# Patient Record
Sex: MALE | Race: WHITE | Employment: FULL TIME | URBAN - METROPOLITAN AREA
[De-identification: names, ages, dates, MRNs, and addresses within clinical notes are randomized per-mention and may not be internally consistent; named-entity substitution may affect disease eponyms.]

---

## 2017-02-28 ENCOUNTER — HOSPITAL ENCOUNTER (OUTPATIENT)
Dept: CT IMAGING | Age: 37
Discharge: HOME | End: 2017-02-28
Attending: INTERNAL MEDICINE
Payer: COMMERCIAL

## 2017-02-28 DIAGNOSIS — R10.32 LLQ PAIN: ICD-10-CM

## 2017-02-28 PROCEDURE — 74011636320 HC RX REV CODE- 636/320: Performed by: INTERNAL MEDICINE

## 2017-02-28 PROCEDURE — 74011000258 HC RX REV CODE- 258: Performed by: INTERNAL MEDICINE

## 2017-02-28 PROCEDURE — 74177 CT ABD & PELVIS W/CONTRAST: CPT

## 2017-02-28 RX ORDER — SODIUM CHLORIDE 0.9 % (FLUSH) 0.9 %
10 SYRINGE (ML) INJECTION
Status: COMPLETED | OUTPATIENT
Start: 2017-02-28 | End: 2017-02-28

## 2017-02-28 RX ADMIN — IOVERSOL 100 ML: 741 INJECTION INTRA-ARTERIAL; INTRAVENOUS at 10:56

## 2017-02-28 RX ADMIN — Medication 10 ML: at 10:56

## 2017-02-28 RX ADMIN — SODIUM CHLORIDE 100 ML: 900 INJECTION, SOLUTION INTRAVENOUS at 10:56

## 2017-02-28 RX ADMIN — DIATRIZOATE MEGLUMINE AND DIATRIZOATE SODIUM 15 ML: 660; 100 LIQUID ORAL; RECTAL at 10:55

## 2019-06-03 PROBLEM — E66.09 CLASS 1 OBESITY DUE TO EXCESS CALORIES WITHOUT SERIOUS COMORBIDITY WITH BODY MASS INDEX (BMI) OF 33.0 TO 33.9 IN ADULT: Status: ACTIVE | Noted: 2019-06-03

## 2020-05-29 PROBLEM — E66.01 SEVERE OBESITY (HCC): Status: ACTIVE | Noted: 2020-05-29

## 2020-07-22 PROBLEM — F41.1 GAD (GENERALIZED ANXIETY DISORDER): Status: ACTIVE | Noted: 2020-07-22

## 2021-09-05 ENCOUNTER — HOSPITAL ENCOUNTER (EMERGENCY)
Age: 41
Discharge: HOME | End: 2021-09-05
Attending: EMERGENCY MEDICINE
Payer: COMMERCIAL

## 2021-09-05 ENCOUNTER — APPOINTMENT (OUTPATIENT)
Dept: CT IMAGING | Age: 41
End: 2021-09-05
Attending: EMERGENCY MEDICINE
Payer: COMMERCIAL

## 2021-09-05 VITALS
WEIGHT: 255 LBS | HEART RATE: 108 BPM | DIASTOLIC BLOOD PRESSURE: 92 MMHG | SYSTOLIC BLOOD PRESSURE: 154 MMHG | HEIGHT: 70 IN | OXYGEN SATURATION: 99 % | BODY MASS INDEX: 36.51 KG/M2 | TEMPERATURE: 98.1 F | RESPIRATION RATE: 18 BRPM

## 2021-09-05 DIAGNOSIS — R10.32 ABDOMINAL PAIN, LLQ (LEFT LOWER QUADRANT): Primary | ICD-10-CM

## 2021-09-05 LAB
ALBUMIN SERPL-MCNC: 3.9 G/DL (ref 3.5–5)
ALBUMIN/GLOB SERPL: 1 {RATIO} (ref 1.2–3.5)
ALP SERPL-CCNC: 67 U/L (ref 50–136)
ALT SERPL-CCNC: 95 U/L (ref 12–65)
ANION GAP SERPL CALC-SCNC: 4 MMOL/L (ref 7–16)
AST SERPL-CCNC: 82 U/L (ref 15–37)
BASOPHILS # BLD: 0 K/UL (ref 0–0.2)
BASOPHILS NFR BLD: 1 % (ref 0–2)
BILIRUB SERPL-MCNC: 0.5 MG/DL (ref 0.2–1.1)
BUN SERPL-MCNC: 8 MG/DL (ref 6–23)
CALCIUM SERPL-MCNC: 9.6 MG/DL (ref 8.3–10.4)
CHLORIDE SERPL-SCNC: 105 MMOL/L (ref 98–107)
CO2 SERPL-SCNC: 28 MMOL/L (ref 21–32)
CREAT SERPL-MCNC: 0.95 MG/DL (ref 0.8–1.5)
DIFFERENTIAL METHOD BLD: NORMAL
EOSINOPHIL # BLD: 0.3 K/UL (ref 0–0.8)
EOSINOPHIL NFR BLD: 5 % (ref 0.5–7.8)
ERYTHROCYTE [DISTWIDTH] IN BLOOD BY AUTOMATED COUNT: 12.6 % (ref 11.9–14.6)
GLOBULIN SER CALC-MCNC: 4 G/DL (ref 2.3–3.5)
GLUCOSE SERPL-MCNC: 108 MG/DL (ref 65–100)
HCT VFR BLD AUTO: 48.7 % (ref 41.1–50.3)
HGB BLD-MCNC: 16.7 G/DL (ref 13.6–17.2)
IMM GRANULOCYTES # BLD AUTO: 0 K/UL (ref 0–0.5)
IMM GRANULOCYTES NFR BLD AUTO: 0 % (ref 0–5)
LIPASE SERPL-CCNC: 133 U/L (ref 73–393)
LYMPHOCYTES # BLD: 2.4 K/UL (ref 0.5–4.6)
LYMPHOCYTES NFR BLD: 34 % (ref 13–44)
MCH RBC QN AUTO: 30.9 PG (ref 26.1–32.9)
MCHC RBC AUTO-ENTMCNC: 34.3 G/DL (ref 31.4–35)
MCV RBC AUTO: 90.2 FL (ref 79.6–97.8)
MONOCYTES # BLD: 0.6 K/UL (ref 0.1–1.3)
MONOCYTES NFR BLD: 9 % (ref 4–12)
NEUTS SEG # BLD: 3.5 K/UL (ref 1.7–8.2)
NEUTS SEG NFR BLD: 51 % (ref 43–78)
NRBC # BLD: 0 K/UL (ref 0–0.2)
PLATELET # BLD AUTO: 277 K/UL (ref 150–450)
PMV BLD AUTO: 9.7 FL (ref 9.4–12.3)
POTASSIUM SERPL-SCNC: 5.9 MMOL/L (ref 3.5–5.1)
PROT SERPL-MCNC: 7.9 G/DL (ref 6.3–8.2)
RBC # BLD AUTO: 5.4 M/UL (ref 4.23–5.6)
SODIUM SERPL-SCNC: 137 MMOL/L (ref 138–145)
WBC # BLD AUTO: 6.9 K/UL (ref 4.3–11.1)

## 2021-09-05 PROCEDURE — 99283 EMERGENCY DEPT VISIT LOW MDM: CPT

## 2021-09-05 PROCEDURE — 74177 CT ABD & PELVIS W/CONTRAST: CPT

## 2021-09-05 PROCEDURE — 80053 COMPREHEN METABOLIC PANEL: CPT

## 2021-09-05 PROCEDURE — 74011000258 HC RX REV CODE- 258: Performed by: EMERGENCY MEDICINE

## 2021-09-05 PROCEDURE — 85025 COMPLETE CBC W/AUTO DIFF WBC: CPT

## 2021-09-05 PROCEDURE — 74011000636 HC RX REV CODE- 636: Performed by: EMERGENCY MEDICINE

## 2021-09-05 PROCEDURE — 83690 ASSAY OF LIPASE: CPT

## 2021-09-05 RX ORDER — SODIUM CHLORIDE, SODIUM LACTATE, POTASSIUM CHLORIDE, CALCIUM CHLORIDE 600; 310; 30; 20 MG/100ML; MG/100ML; MG/100ML; MG/100ML
1000 INJECTION, SOLUTION INTRAVENOUS CONTINUOUS
Status: DISCONTINUED | OUTPATIENT
Start: 2021-09-05 | End: 2021-09-05 | Stop reason: HOSPADM

## 2021-09-05 RX ORDER — SODIUM CHLORIDE 0.9 % (FLUSH) 0.9 %
5-10 SYRINGE (ML) INJECTION AS NEEDED
Status: DISCONTINUED | OUTPATIENT
Start: 2021-09-05 | End: 2021-09-05 | Stop reason: HOSPADM

## 2021-09-05 RX ORDER — HYDROCODONE BITARTRATE AND ACETAMINOPHEN 5; 325 MG/1; MG/1
1-2 TABLET ORAL
Qty: 19 TABLET | Refills: 0 | Status: SHIPPED | OUTPATIENT
Start: 2021-09-05 | End: 2021-09-08

## 2021-09-05 RX ORDER — SODIUM CHLORIDE 0.9 % (FLUSH) 0.9 %
5-10 SYRINGE (ML) INJECTION EVERY 8 HOURS
Status: DISCONTINUED | OUTPATIENT
Start: 2021-09-05 | End: 2021-09-05 | Stop reason: HOSPADM

## 2021-09-05 RX ORDER — SODIUM CHLORIDE 0.9 % (FLUSH) 0.9 %
10 SYRINGE (ML) INJECTION
Status: COMPLETED | OUTPATIENT
Start: 2021-09-05 | End: 2021-09-05

## 2021-09-05 RX ADMIN — IOPAMIDOL 100 ML: 755 INJECTION, SOLUTION INTRAVENOUS at 13:35

## 2021-09-05 RX ADMIN — Medication 10 ML: at 13:36

## 2021-09-05 RX ADMIN — SODIUM CHLORIDE 100 ML: 900 INJECTION, SOLUTION INTRAVENOUS at 13:36

## 2021-09-05 NOTE — ED TRIAGE NOTES
Pt ambulates to triage with mask on.  Pt states that he has been having abdominal pain for 2 weeks and has a hx of diverticulitis and had surgeyr for this issue in 2015

## 2021-09-05 NOTE — ED NOTES
I have reviewed discharge instructions with the patient. The patient verbalized understanding. Patient left ED via Discharge Method: ambulatory to Home with self. Opportunity for questions and clarification provided. Patient given 1 scripts. No e-sign. To continue your aftercare when you leave the hospital, you may receive an automated call from our care team to check in on how you are doing. This is a free service and part of our promise to provide the best care and service to meet your aftercare needs.  If you have questions, or wish to unsubscribe from this service please call [phone]. Thank you for Choosing our [facility] Emergency Department.

## 2021-09-05 NOTE — ED PROVIDER NOTES
49-year-old male presents to the ER with complaints of progressive left lower quadrant abdominal pain over more than a week. Denies overt fever vomiting or diarrhea  Patient states his symptoms remind him of previous episodes of diverticulitis. Patient did have partial colectomy in 2015 at [facility] for the diverticulitis. He has not had prior flareups since that time  Patient using high Cosamin with no significant improvement in his symptoms    The history is provided by the patient. Abdominal Pain   This is a recurrent problem. The current episode started more than 2 days ago. The problem occurs constantly. The problem has been gradually worsening. The pain is associated with an unknown factor. The pain is located in the LLQ. The quality of the pain is aching and cramping. The pain is moderate. Associated symptoms include a fever. Pertinent negatives include no diarrhea, no nausea, no vomiting, no constipation, no dysuria, no headaches, no arthralgias, no myalgias, no trauma, no chest pain and no back pain. The pain is worsened by eating. The pain is relieved by nothing. Past workup includes CT scan, surgery. His past medical history is significant for diverticulitis. The patient's surgical history includes colectomy.        Past Medical History:   Diagnosis Date    Diverticulitis 2015       Past Surgical History:   Procedure Laterality Date    HX GI  10/2015    colonectomy due to diverticulitis at [facility] History:   Problem Relation Age of Onset    Cancer Mother         leiomyosarcoma - leg    Heart Disease Father         heart valve leaky    Hypertension Father        Social History     Socioeconomic History    Marital status:      Spouse name: Not on file    Number of children: Not on file    Years of education: Not on file    Highest education level: Not on file   Occupational History    Not on file   Tobacco Use    Smoking status: Never Smoker    Smokeless tobacco: Never Used   Substance and Sexual Activity    Alcohol use: Yes     Comment: 2-3 x a week    Drug use: No    Sexual activity: Not on file   Other Topics Concern    Not on file   Social History Narrative    Not on file     Social Determinants of Health     Financial Resource Strain:     Difficulty of Paying Living Expenses:    Food Insecurity:     Worried About Running Out of Food in the Last Year:     [address] in the Last Year:    Transportation Needs:     Lack of Transportation (Medical):  Lack of Transportation (Non-Medical):    Physical Activity:     Days of Exercise per Week:     Minutes of Exercise per Session:    Stress:     Feeling of Stress :    Social Connections:     Frequency of Communication with Friends and Family:     Frequency of Social Gatherings with Friends and Family:     Attends Mandaeism Services:     Active Member of Clubs or Organizations:     Attends Club or Organization Meetings:     Marital Status:    Intimate Partner Violence:     Fear of Current or Ex-Partner:     Emotionally Abused:     Physically Abused:     Sexually Abused: ALLERGIES: Patient has no known allergies. Review of Systems   Constitutional: Positive for fever. Negative for activity change, chills and diaphoresis. HENT: Negative for dental problem, hearing loss, nosebleeds, rhinorrhea and sore throat. Eyes: Negative for pain, discharge, redness and visual disturbance. Respiratory: Negative for cough, chest tightness and shortness of breath. Cardiovascular: Negative for chest pain, palpitations and leg swelling. Gastrointestinal: Positive for abdominal pain. Negative for constipation, diarrhea, nausea and vomiting. Endocrine: Negative for cold intolerance, heat intolerance, polydipsia and polyuria. Genitourinary: Negative for dysuria and flank pain. Musculoskeletal: Negative for arthralgias, back pain, joint swelling, myalgias and neck pain.    Skin: Negative for pallor and rash. Allergic/Immunologic: Negative for environmental allergies and food allergies. Neurological: Negative for dizziness, tremors, light-headedness, numbness and headaches. Hematological: Negative for adenopathy. Does not bruise/bleed easily. Psychiatric/Behavioral: Negative for confusion and dysphoric mood. The patient is not nervous/anxious and is not hyperactive. All other systems reviewed and are negative. Vitals:    09/05/21 1135   BP: (!) 161/104   Pulse: (!) 111   Resp: 17   Temp: 98.3 °F (36.8 °C)   SpO2: 98%   Weight: 115.7 kg (255 lb)   Height: 5' 10\" (1.778 m)            Physical Exam  Vitals and nursing note reviewed. Constitutional:       General: He is in acute distress. Appearance: He is well-developed. He is obese. HENT:      Head: Normocephalic and atraumatic. Right Ear: External ear normal.      Left Ear: External ear normal.      Mouth/Throat:      Mouth: Mucous membranes are moist.      Pharynx: Oropharynx is clear. No oropharyngeal exudate. Eyes:      General: No scleral icterus. Extraocular Movements: Extraocular movements intact. Conjunctiva/sclera: Conjunctivae normal.      Pupils: Pupils are equal, round, and reactive to light. Neck:      Thyroid: No thyromegaly. Vascular: No JVD. Cardiovascular:      Rate and Rhythm: Normal rate and regular rhythm. Heart sounds: Normal heart sounds. No murmur heard. No friction rub. No gallop. Pulmonary:      Effort: Pulmonary effort is normal. No respiratory distress. Breath sounds: Normal breath sounds. No wheezing. Abdominal:      General: Bowel sounds are normal. There is no distension. Palpations: Abdomen is soft. Tenderness: There is abdominal tenderness in the left lower quadrant. There is no right CVA tenderness, left CVA tenderness, guarding or rebound. Musculoskeletal:         General: No tenderness or deformity. Normal range of motion. Cervical back: Normal range of motion and neck supple. Skin:     General: Skin is warm and dry. Capillary Refill: Capillary refill takes less than 2 seconds. Findings: No rash. Neurological:      General: No focal deficit present. Mental Status: He is alert and oriented to person, place, and time. Cranial Nerves: No cranial nerve deficit. Sensory: No sensory deficit. Motor: No abnormal muscle tone. Coordination: Coordination normal.   Psychiatric:         Mood and Affect: Mood normal.         Behavior: Behavior normal.         Thought Content: Thought content normal.         Judgment: Judgment normal.          MDM  Number of Diagnoses or Management Options  Abdominal pain, LLQ (left lower quadrant): new and requires workup  Diagnosis management comments: 73-year-old male with left lower quadrant abdominal pain    Differential diagnosis would include viral syndrome, muscle strain, diverticulitis, enteritis, abscess, perforation kidney stone, UTI    2:03 PM  Work-up today reveals no evidence of acute findings on his CT  Labs unremarkable as well    Patient remains reasonably comfortable    We will give the patient a few Norco in case he has a flareup of pain until he can see his regular doctor       Amount and/or Complexity of Data Reviewed  Clinical lab tests: ordered and reviewed  Tests in the radiology section of CPT®: ordered and reviewed  Tests in the medicine section of CPT®: reviewed  Decide to obtain previous medical records or to obtain history from someone other than the patient: yes  Review and summarize past medical records: yes  Independent visualization of images, tracings, or specimens: yes    Risk of Complications, Morbidity, and/or Mortality  Presenting problems: moderate  Diagnostic procedures: moderate  Management options: low  General comments: Elements of this note have been dictated via voice recognition software.   Text and phrases may be limited by the accuracy of the software. The chart has been reviewed, but errors may still be present.       Patient Progress  Patient progress: stable         Procedures

## 2021-09-05 NOTE — DISCHARGE INSTRUCTIONS
Take medications as directed  Call your family doctor for follow-up visit  Do not drink alcohol or drive while taking the prescription pain medications  No lifting, bending or other strenous activities  Return to ER for any worsening symptoms or new problems which may arise

## 2022-01-10 ENCOUNTER — HOSPITAL ENCOUNTER (OUTPATIENT)
Dept: CT IMAGING | Age: 42
Discharge: HOME | End: 2022-01-10
Attending: INTERNAL MEDICINE
Payer: SELF-PAY

## 2022-01-10 DIAGNOSIS — E66.09 CLASS 2 OBESITY DUE TO EXCESS CALORIES WITHOUT SERIOUS COMORBIDITY WITH BODY MASS INDEX (BMI) OF 38.0 TO 38.9 IN ADULT: ICD-10-CM

## 2022-01-10 PROCEDURE — 75571 CT HRT W/O DYE W/CA TEST: CPT

## 2022-03-18 PROBLEM — E66.09 CLASS 2 OBESITY DUE TO EXCESS CALORIES WITHOUT SERIOUS COMORBIDITY WITH BODY MASS INDEX (BMI) OF 38.0 TO 38.9 IN ADULT: Status: ACTIVE | Noted: 2019-06-03

## 2022-03-18 PROBLEM — E66.812 CLASS 2 OBESITY DUE TO EXCESS CALORIES WITHOUT SERIOUS COMORBIDITY WITH BODY MASS INDEX (BMI) OF 38.0 TO 38.9 IN ADULT: Status: ACTIVE | Noted: 2019-06-03

## 2022-05-16 DIAGNOSIS — R79.89 ELEVATED LIVER FUNCTION TESTS: Primary | ICD-10-CM

## 2022-07-13 ENCOUNTER — OFFICE VISIT (OUTPATIENT)
Dept: FAMILY MEDICINE CLINIC | Facility: CLINIC | Age: 42
End: 2022-07-13
Payer: COMMERCIAL

## 2022-07-13 VITALS
BODY MASS INDEX: 39.57 KG/M2 | SYSTOLIC BLOOD PRESSURE: 126 MMHG | RESPIRATION RATE: 16 BRPM | HEIGHT: 70 IN | HEART RATE: 83 BPM | WEIGHT: 276.4 LBS | DIASTOLIC BLOOD PRESSURE: 84 MMHG | OXYGEN SATURATION: 99 % | TEMPERATURE: 97.2 F

## 2022-07-13 DIAGNOSIS — M54.6 ACUTE MIDLINE THORACIC BACK PAIN: Primary | ICD-10-CM

## 2022-07-13 PROCEDURE — 99214 OFFICE O/P EST MOD 30 MIN: CPT | Performed by: FAMILY MEDICINE

## 2022-07-13 RX ORDER — BACLOFEN 10 MG/1
TABLET ORAL
Qty: 45 TABLET | Refills: 1 | Status: SHIPPED | OUTPATIENT
Start: 2022-07-13

## 2022-07-13 RX ORDER — PREDNISONE 20 MG/1
20 TABLET ORAL 2 TIMES DAILY
Qty: 10 TABLET | Refills: 0 | Status: SHIPPED | OUTPATIENT
Start: 2022-07-13 | End: 2022-07-18

## 2022-07-13 ASSESSMENT — PATIENT HEALTH QUESTIONNAIRE - PHQ9
2. FEELING DOWN, DEPRESSED OR HOPELESS: 0
SUM OF ALL RESPONSES TO PHQ9 QUESTIONS 1 & 2: 0
SUM OF ALL RESPONSES TO PHQ QUESTIONS 1-9: 0
1. LITTLE INTEREST OR PLEASURE IN DOING THINGS: 0
SUM OF ALL RESPONSES TO PHQ QUESTIONS 1-9: 0

## 2022-07-13 ASSESSMENT — ENCOUNTER SYMPTOMS
BOWEL INCONTINENCE: 0
BACK PAIN: 1
ABDOMINAL PAIN: 0

## 2022-07-13 NOTE — PROGRESS NOTES
HISTORY OF PRESENT ILLNESS  Chief Complaint   Patient presents with    Back Pain     middle; started about 2 weeks       Working out at the gym and then started jogging again  Back started hurting and then went away. Did a 10 min jagg and still there. Tried massage heped some  Coughs or sneezing stabbing pain in the mid back. Using advil has to repeat the dose 6-8 h later    history of back injury 20 yrs ago - given naproxen and MR and got better. Lower thoracic area more on the right but is on both sides  Bending over and stretched and then started spasms. Usually as low as a 2 with advil    alleve did not relieve the pain    Wt Readings from Last 3 Encounters:   07/13/22 276 lb 6.4 oz (125.4 kg)   02/23/22 270 lb (122.5 kg)   02/17/22 265 lb (120.2 kg)      BP Readings from Last 3 Encounters:   07/13/22 126/84   02/23/22 128/78   02/17/22 (!) 128/90        Back Pain  This is a new problem. The current episode started 1 to 4 weeks ago. The problem occurs daily. The problem is unchanged. The pain is present in the thoracic spine. The quality of the pain is described as stabbing. The pain does not radiate. The pain is at a severity of 9/10. The symptoms are aggravated by bending, position, twisting and coughing. Stiffness is present In the morning. Pertinent negatives include no abdominal pain, bladder incontinence, bowel incontinence, chest pain, dysuria, fever, headaches, leg pain, numbness, paresis, paresthesias, pelvic pain, perianal numbness, tingling, weakness or weight loss. He has tried analgesics, NSAIDs, walking, heat, ice and bed rest for the symptoms. The treatment provided mild relief. Review of Systems   Constitutional:  Negative for fever and weight loss. Cardiovascular:  Negative for chest pain. Gastrointestinal:  Negative for abdominal pain and bowel incontinence. Genitourinary:  Negative for bladder incontinence, dysuria and pelvic pain.    Musculoskeletal:  Positive for back pain.   Neurological:  Negative for tingling, weakness, numbness, headaches and paresthesias. Patient Active Problem List   Diagnosis    Class 2 obesity due to excess calories without serious comorbidity with body mass index (BMI) of 38.0 to 38.9 in adult         Blood pressure 126/84, pulse 83, temperature 97.2 °F (36.2 °C), temperature source Temporal, resp. rate 16, height 5' 10\" (1.778 m), weight 276 lb 6.4 oz (125.4 kg), SpO2 99 %. Pain Scale: /10 Pain Location:   Body mass index is 39.66 kg/m². Physical Exam  Vitals and nursing note reviewed. Constitutional:       General: He is not in acute distress. Appearance: He is normal weight. He is not toxic-appearing. HENT:      Head: Normocephalic. Eyes:      Extraocular Movements: Extraocular movements intact. Conjunctiva/sclera: Conjunctivae normal.      Pupils: Pupils are equal, round, and reactive to light. Pupils are equal.   Cardiovascular:      Heart sounds: Normal heart sounds. No murmur heard. No friction rub. No gallop. Pulmonary:      Effort: Pulmonary effort is normal. No respiratory distress. Breath sounds: Normal breath sounds. No stridor. No wheezing, rhonchi or rales. Chest:      Chest wall: No tenderness. Musculoskeletal:      Thoracic back: Tenderness present. No swelling, edema, deformity, lacerations, spasms or bony tenderness. Normal range of motion. Skin:     General: Skin is warm and dry. Neurological:      General: No focal deficit present. Mental Status: He is alert. Sensory: Sensation is intact. Motor: Motor function is intact. Psychiatric:         Mood and Affect: Mood normal.         Behavior: Behavior normal.         Thought Content: Thought content normal.         Judgment: Judgment normal.            ASSESSMENT and PLAN   Diagnosis Orders   1.  Acute midline thoracic back pain  predniSONE (DELTASONE) 20 MG tablet    baclofen (LIORESAL) 10 MG tablet          Reviewed medications and side effects in detail    The patient's condition was discussed at length with the patient. The expected course and possible complications were reviewed. All questions were answered. His other chronic conditions are stable at this time. He is stable on the current treatment and tolerating it well. No significant sides effects. Will not adjust therapy at this time, unless noted above. We will continue to monitor for any problems. Medications refilled and lab work has been ordered where needed. Reviewed medications are explained including any potential interactions or side effects in detail. The patient's questions were answered. He  understands the above and has no further questions. Further workup and treatment should be done if symptoms persist, worsen or new symptoms occur. He  will call to notify us of any problems, complications or worsening symptoms. Patient Instructions     Patient Education        Healthy Upper Back: Exercises  Introduction  Here are some examples of exercises for your upper back. Start each exerciseslowly. Ease off the exercise if you start to have pain. Your doctor or physical therapist will tell you when you can start theseexercises and which ones will work best for you. How to do the exercises  Lower neck and upper back stretch    Stretch your arms out in front of your body. Clasp one hand on top of your other hand. Gently reach out so that you feel your shoulder blades stretching away from each other. Gently bend your head forward. Hold for 15 to 30 seconds. Repeat 2 to 4 times. Midback stretch    If you have knee pain, do not do this exercise. Kneel on the floor, and sit back on your ankles. Lean forward, place your hands on the floor, and stretch your arms out in front of you. Rest your head between your arms. Gently push your chest toward the floor, reaching as far in front of you as possible. Hold for 15 to 30 seconds.   Repeat 2 to 4 times.  Shoulder rolls    Sit comfortably with your feet shoulder-width apart. You can also do this exercise while standing. Roll your shoulders up, then back, and then down in a smooth, circular motion. Repeat 2 to 4 times. Wall push-up    Stand against a wall with your feet about 12 to 24 inches back from the wall. If you feel any pain when you do this exercise, stand closer to the wall. Place your hands on the wall slightly wider apart than your shoulders, and lean forward. Gently lean your body toward the wall. Then push back to your starting position. Keep the motion smooth and controlled. Repeat 8 to 12 times. Resisted shoulder blade squeeze    For this exercise, you will need elastic exercise material, such as surgicaltubing or Thera-Band. Sit or stand, holding the band in both hands in front of you. Keep your elbows close to your sides, bent at a 90-degree angle. Your palms should face up. Squeeze your shoulder blades together, and move your arms to the outside, stretching the band. Be sure to keep your elbows at your sides while you do this. Relax. Repeat 8 to 12 times. Resisted rows    For this exercise, you will need elastic exercise material, such as surgicaltubing or Thera-Band. Put the band around a solid object, such as a bedpost, at about waist level. Hold one end of the band in each hand. With your elbows at your sides and bent to 90 degrees, pull the band back to move your shoulder blades toward each other. Return to the starting position. Repeat 8 to 12 times. Follow-up care is a key part of your treatment and safety. Be sure to make and go to all appointments, and call your doctor if you are having problems. It's also a good idea to know your test results and keep alist of the medicines you take. Where can you learn more? Go to https://chjaninaeb.Mieple. org and sign in to your [employer] account.  Enter P802 in the [employer] box to learn more about \"Healthy Upper Back: Exercises. \"     If you do not have an account, please click on the \"Sign Up Now\" link. Current as of: March 9, 2022               Content Version: 13.3  © 2006-2022 Healthwise, Incorporated. Care instructions adapted under license by [facility] ([facility]). If you have questions about a medical condition or this instruction, always ask your healthcare professional. [address] any warranty or liability for your use of this information. Patient Education        Low Back Pain: Exercises  Introduction  Here are some examples of exercises for you to try. The exercises may be suggested for a condition or for rehabilitation. Start each exercise slowly. Ease off the exercises if you start to have pain. You will be told when to start these exercises and which ones will work bestfor you. How to do the exercises  Press-up    Lie on your stomach, supporting your body with your forearms. Press your elbows down into the floor to raise your upper back. As you do this, relax your stomach muscles and allow your back to arch without using your back muscles. As your press up, do not let your hips or pelvis come off the floor. Hold for 15 to 30 seconds, then relax. Repeat 2 to 4 times. Alternate arm and leg (bird dog) exercise    Do this exercise slowly. Try to keep your body straight at all times, and donot let one hip drop lower than the other. Start on the floor, on your hands and knees. Tighten your belly muscles. Raise one leg off the floor, and hold it straight out behind you. Be careful not to let your hip drop down, because that will twist your trunk. Hold for about 6 seconds, then lower your leg and switch to the other leg. Repeat 8 to 12 times on each leg. Over time, work up to holding for 10 to 30 seconds each time. If you feel stable and secure with your leg raised, try raising the opposite arm straight out in front of you at the same time.   Knee-to-chest exercise    Lie on your back with your knees bent and your feet flat on the floor. Bring one knee to your chest, keeping the other foot flat on the floor (or keeping the other leg straight, whichever feels better on your lower back). Keep your lower back pressed to the floor. Hold for at least 15 to 30 seconds. Relax, and lower the knee to the starting position. Repeat with the other leg. Repeat 2 to 4 times with each leg. To get more stretch, put your other leg flat on the floor while pulling your knee to your chest.  Curl-ups    Lie on the floor on your back with your knees bent at a 90-degree angle. Your feet should be flat on the floor, about 12 inches from your buttocks. Cross your arms over your chest. If this bothers your neck, try putting your hands behind your neck (not your head), with your elbows spread apart. Slowly tighten your belly muscles and raise your shoulder blades off the floor. Keep your head in line with your body, and do not press your chin to your chest.  Hold this position for 1 or 2 seconds, then slowly lower yourself back down to the floor. Repeat 8 to 12 times. Pelvic tilt exercise    Lie on your back with your knees bent. \"Brace\" your stomach. This means to tighten your muscles by pulling in and imagining your belly button moving toward your spine. You should feel like your back is pressing to the floor and your hips and pelvis are rocking back. Hold for about 6 seconds while you breathe smoothly. Repeat 8 to 12 times. Heel dig bridging    Lie on your back with both knees bent and your ankles bent so that only your heels are digging into the floor. Your knees should be bent about 90 degrees. Then push your heels into the floor, squeeze your buttocks, and lift your hips off the floor until your shoulders, hips, and knees are all in a straight line.   Hold for about 6 seconds as you continue to breathe normally, and then slowly lower your hips back down to the floor and rest for up to 10 seconds. Do 8 to 12 repetitions. Hamstring stretch in doorway    Lie on your back in a doorway, with one leg through the open door. Slide your leg up the wall to straighten your knee. You should feel a gentle stretch down the back of your leg. Hold the stretch for at least 15 to 30 seconds. Do not arch your back, point your toes, or bend either knee. Keep one heel touching the floor and the other heel touching the wall. Repeat with your other leg. Do 2 to 4 times for each leg. Hip flexor stretch    Kneel on the floor with one knee bent and one leg behind you. Place your forward knee over your foot. Keep your other knee touching the floor. Slowly push your hips forward until you feel a stretch in the upper thigh of your rear leg. Hold the stretch for at least 15 to 30 seconds. Repeat with your other leg. Do 2 to 4 times on each side. Wall sit    Stand with your back 10 to 12 inches away from a wall. Lean into the wall until your back is flat against it. Slowly slide down until your knees are slightly bent, pressing your lower back into the wall. Hold for about 6 seconds, then slide back up the wall. Repeat 8 to 12 times. Follow-up care is a key part of your treatment and safety. Be sure to make and go to all appointments, and call your doctor if you are having problems. It's also a good idea to know your test results and keep alist of the medicines you take. Where can you learn more? Go to https://Doodle MobilekristieINAPPIN.[employer]. org and sign in to your [employer] account. Enter J314 in the [facility] box to learn more about \"Low Back Pain: Exercises. \"     If you do not have an account, please click on the \"Sign Up Now\" link. Current as of: March 9, 2022               Content Version: 13.3  © 4422-3247 Healthwise, Incorporated. Care instructions adapted under license by [facility] ([facility]).  If you have questions about a medical condition or this instruction, always ask your healthcare professional. [address] any warranty or liability for your use of this information. (Some details in this note may have been created with speech-recognition software. Some errors in speech recognition may have occurred.    Most of those have been corrected but some may have been missed.)         Isabella Olivera MD  [address] [facility] [address]  Phone [phone]

## 2022-07-13 NOTE — PATIENT INSTRUCTIONS
Patient Education        Healthy Upper Back: Exercises  Introduction  Here are some examples of exercises for your upper back. Start each exerciseslowly. Ease off the exercise if you start to have pain. Your doctor or physical therapist will tell you when you can start theseexercises and which ones will work best for you. How to do the exercises  Lower neck and upper back stretch    1. Stretch your arms out in front of your body. Clasp one hand on top of your other hand. 2. Gently reach out so that you feel your shoulder blades stretching away from each other. 3. Gently bend your head forward. 4. Hold for 15 to 30 seconds. 5. Repeat 2 to 4 times. Midback stretch    If you have knee pain, do not do this exercise. 1. Kneel on the floor, and sit back on your ankles. 2. Lean forward, place your hands on the floor, and stretch your arms out in front of you. Rest your head between your arms. 3. Gently push your chest toward the floor, reaching as far in front of you as possible. 4. Hold for 15 to 30 seconds. 5. Repeat 2 to 4 times. Shoulder rolls    1. Sit comfortably with your feet shoulder-width apart. You can also do this exercise while standing. 2. Roll your shoulders up, then back, and then down in a smooth, circular motion. 3. Repeat 2 to 4 times. Wall push-up    1. Stand against a wall with your feet about 12 to 24 inches back from the wall. If you feel any pain when you do this exercise, stand closer to the wall. 2. Place your hands on the wall slightly wider apart than your shoulders, and lean forward. 3. Gently lean your body toward the wall. Then push back to your starting position. Keep the motion smooth and controlled. 4. Repeat 8 to 12 times. Resisted shoulder blade squeeze    For this exercise, you will need elastic exercise material, such as surgicaltubing or Thera-Band. 1. Sit or stand, holding the band in both hands in front of you.  Keep your elbows close to your sides, bent at a 90-degree angle. Your palms should face up. 2. Squeeze your shoulder blades together, and move your arms to the outside, stretching the band. Be sure to keep your elbows at your sides while you do this. 3. Relax. 4. Repeat 8 to 12 times. Resisted rows    For this exercise, you will need elastic exercise material, such as surgicaltubing or Thera-Band. 1. Put the band around a solid object, such as a bedpost, at about waist level. Hold one end of the band in each hand. 2. With your elbows at your sides and bent to 90 degrees, pull the band back to move your shoulder blades toward each other. Return to the starting position. 3. Repeat 8 to 12 times. Follow-up care is a key part of your treatment and safety. Be sure to make and go to all appointments, and call your doctor if you are having problems. It's also a good idea to know your test results and keep alist of the medicines you take. Where can you learn more? Go to https://CreditCardsOnline.WestBridge. org and sign in to your [employer] account. Enter Y487 in the [employer] box to learn more about \"Healthy Upper Back: Exercises. \"     If you do not have an account, please click on the \"Sign Up Now\" link. Current as of: March 9, 2022               Content Version: 13.3  © 2006-2022 Healthwise, Incorporated. Care instructions adapted under license by [facility] ([facility]). If you have questions about a medical condition or this instruction, always ask your healthcare professional. [place] [address] any warranty or liability for your use of this information. Patient Education        Low Back Pain: Exercises  Introduction  Here are some examples of exercises for you to try. The exercises may be suggested for a condition or for rehabilitation. Start each exercise slowly. Ease off the exercises if you start to have pain. You will be told when to start these exercises and which ones will work bestfor you.   How to do the exercises  Press-up    1. Lie on your stomach, supporting your body with your forearms. 2. Press your elbows down into the floor to raise your upper back. As you do this, relax your stomach muscles and allow your back to arch without using your back muscles. As your press up, do not let your hips or pelvis come off the floor. 3. Hold for 15 to 30 seconds, then relax. 4. Repeat 2 to 4 times. Alternate arm and leg (bird dog) exercise    Do this exercise slowly. Try to keep your body straight at all times, and donot let one hip drop lower than the other. 1. Start on the floor, on your hands and knees. 2. Tighten your belly muscles. 3. Raise one leg off the floor, and hold it straight out behind you. Be careful not to let your hip drop down, because that will twist your trunk. 4. Hold for about 6 seconds, then lower your leg and switch to the other leg. 5. Repeat 8 to 12 times on each leg. 6. Over time, work up to holding for 10 to 30 seconds each time. 7. If you feel stable and secure with your leg raised, try raising the opposite arm straight out in front of you at the same time. Knee-to-chest exercise    1. Lie on your back with your knees bent and your feet flat on the floor. 2. Bring one knee to your chest, keeping the other foot flat on the floor (or keeping the other leg straight, whichever feels better on your lower back). 3. Keep your lower back pressed to the floor. Hold for at least 15 to 30 seconds. 4. Relax, and lower the knee to the starting position. 5. Repeat with the other leg. Repeat 2 to 4 times with each leg. 6. To get more stretch, put your other leg flat on the floor while pulling your knee to your chest.  Curl-ups    1. Lie on the floor on your back with your knees bent at a 90-degree angle. Your feet should be flat on the floor, about 12 inches from your buttocks.   2. Cross your arms over your chest. If this bothers your neck, try putting your hands behind your neck (not your head), with your elbows spread apart. 3. Slowly tighten your belly muscles and raise your shoulder blades off the floor. 4. Keep your head in line with your body, and do not press your chin to your chest.  5. Hold this position for 1 or 2 seconds, then slowly lower yourself back down to the floor. 6. Repeat 8 to 12 times. Pelvic tilt exercise    1. Lie on your back with your knees bent. 2. \"Brace\" your stomach. This means to tighten your muscles by pulling in and imagining your belly button moving toward your spine. You should feel like your back is pressing to the floor and your hips and pelvis are rocking back. 3. Hold for about 6 seconds while you breathe smoothly. 4. Repeat 8 to 12 times. Heel dig bridging    1. Lie on your back with both knees bent and your ankles bent so that only your heels are digging into the floor. Your knees should be bent about 90 degrees. 2. Then push your heels into the floor, squeeze your buttocks, and lift your hips off the floor until your shoulders, hips, and knees are all in a straight line. 3. Hold for about 6 seconds as you continue to breathe normally, and then slowly lower your hips back down to the floor and rest for up to 10 seconds. 4. Do 8 to 12 repetitions. Hamstring stretch in doorway    1. Lie on your back in a doorway, with one leg through the open door. 2. Slide your leg up the wall to straighten your knee. You should feel a gentle stretch down the back of your leg. 3. Hold the stretch for at least 15 to 30 seconds. Do not arch your back, point your toes, or bend either knee. Keep one heel touching the floor and the other heel touching the wall. 4. Repeat with your other leg. 5. Do 2 to 4 times for each leg. Hip flexor stretch    1. Kneel on the floor with one knee bent and one leg behind you. Place your forward knee over your foot. Keep your other knee touching the floor.   2. Slowly push your hips forward until you feel a stretch in the upper thigh of your rear leg. 3. Hold the stretch for at least 15 to 30 seconds. Repeat with your other leg. 4. Do 2 to 4 times on each side. Wall sit    1. Stand with your back 10 to 12 inches away from a wall. 2. Lean into the wall until your back is flat against it. 3. Slowly slide down until your knees are slightly bent, pressing your lower back into the wall. 4. Hold for about 6 seconds, then slide back up the wall. 5. Repeat 8 to 12 times. Follow-up care is a key part of your treatment and safety. Be sure to make and go to all appointments, and call your doctor if you are having problems. It's also a good idea to know your test results and keep alist of the medicines you take. Where can you learn more? Go to https://chpeannaeb.[email or web address] org and sign in to your [employer] account. Enter Z655 in the [employer] box to learn more about \"Low Back Pain: Exercises. \"     If you do not have an account, please click on the \"Sign Up Now\" link. Current as of: March 9, 2022               Content Version: 13.3  © 5546-4410 Healthwise, Incorporated. Care instructions adapted under license by [facility] ([facility]). If you have questions about a medical condition or this instruction, always ask your healthcare professional. [address] any warranty or liability for your use of this information.

## 2022-12-28 ENCOUNTER — OFFICE VISIT (OUTPATIENT)
Dept: FAMILY MEDICINE CLINIC | Facility: CLINIC | Age: 42
End: 2022-12-28
Payer: COMMERCIAL

## 2022-12-28 VITALS
WEIGHT: 269.6 LBS | RESPIRATION RATE: 16 BRPM | HEIGHT: 70 IN | OXYGEN SATURATION: 98 % | BODY MASS INDEX: 38.6 KG/M2 | SYSTOLIC BLOOD PRESSURE: 127 MMHG | TEMPERATURE: 98.2 F | HEART RATE: 84 BPM | DIASTOLIC BLOOD PRESSURE: 89 MMHG

## 2022-12-28 DIAGNOSIS — Z13.1 SCREENING FOR DIABETES MELLITUS: ICD-10-CM

## 2022-12-28 DIAGNOSIS — Z00.00 ROUTINE GENERAL MEDICAL EXAMINATION AT A HEALTH CARE FACILITY: Primary | ICD-10-CM

## 2022-12-28 DIAGNOSIS — K76.0 FATTY LIVER: ICD-10-CM

## 2022-12-28 DIAGNOSIS — E66.09 CLASS 2 OBESITY DUE TO EXCESS CALORIES WITHOUT SERIOUS COMORBIDITY WITH BODY MASS INDEX (BMI) OF 38.0 TO 38.9 IN ADULT: ICD-10-CM

## 2022-12-28 DIAGNOSIS — R79.89 ELEVATED LIVER FUNCTION TESTS: ICD-10-CM

## 2022-12-28 DIAGNOSIS — Z11.4 SCREENING FOR HIV (HUMAN IMMUNODEFICIENCY VIRUS): ICD-10-CM

## 2022-12-28 DIAGNOSIS — Z13.0 SCREENING, ANEMIA, DEFICIENCY, IRON: ICD-10-CM

## 2022-12-28 DIAGNOSIS — Z13.6 SCREENING FOR CARDIOVASCULAR CONDITION: ICD-10-CM

## 2022-12-28 LAB
ALBUMIN SERPL-MCNC: 4.3 G/DL (ref 3.5–5)
ALBUMIN/GLOB SERPL: 1.4 {RATIO} (ref 0.4–1.6)
ALP SERPL-CCNC: 48 U/L (ref 50–136)
ALT SERPL-CCNC: 95 U/L (ref 12–65)
ANION GAP SERPL CALC-SCNC: 4 MMOL/L (ref 2–11)
AST SERPL-CCNC: 36 U/L (ref 15–37)
BASOPHILS # BLD: 0.1 K/UL (ref 0–0.2)
BASOPHILS NFR BLD: 1 % (ref 0–2)
BILIRUB DIRECT SERPL-MCNC: 0.2 MG/DL
BILIRUB SERPL-MCNC: 0.9 MG/DL (ref 0.2–1.1)
BUN SERPL-MCNC: 14 MG/DL (ref 6–23)
CALCIUM SERPL-MCNC: 9.7 MG/DL (ref 8.3–10.4)
CHLORIDE SERPL-SCNC: 103 MMOL/L (ref 101–110)
CHOLEST SERPL-MCNC: 178 MG/DL
CO2 SERPL-SCNC: 28 MMOL/L (ref 21–32)
CREAT SERPL-MCNC: 1.1 MG/DL (ref 0.8–1.5)
DIFFERENTIAL METHOD BLD: NORMAL
EOSINOPHIL # BLD: 0.2 K/UL (ref 0–0.8)
EOSINOPHIL NFR BLD: 4 % (ref 0.5–7.8)
ERYTHROCYTE [DISTWIDTH] IN BLOOD BY AUTOMATED COUNT: 12.3 % (ref 11.9–14.6)
GLOBULIN SER CALC-MCNC: 3.1 G/DL (ref 2.8–4.5)
GLUCOSE SERPL-MCNC: 104 MG/DL (ref 65–100)
HCT VFR BLD AUTO: 45.1 % (ref 41.1–50.3)
HDLC SERPL-MCNC: 50 MG/DL (ref 40–60)
HDLC SERPL: 3.6 {RATIO}
HGB BLD-MCNC: 15.7 G/DL (ref 13.6–17.2)
HIV 1+2 AB+HIV1 P24 AG SERPL QL IA: NONREACTIVE
HIV 1/2 RESULT COMMENT: NORMAL
IMM GRANULOCYTES # BLD AUTO: 0 K/UL (ref 0–0.5)
IMM GRANULOCYTES NFR BLD AUTO: 0 % (ref 0–5)
LDLC SERPL CALC-MCNC: 110.4 MG/DL
LYMPHOCYTES # BLD: 1.8 K/UL (ref 0.5–4.6)
LYMPHOCYTES NFR BLD: 33 % (ref 13–44)
MCH RBC QN AUTO: 31.7 PG (ref 26.1–32.9)
MCHC RBC AUTO-ENTMCNC: 34.8 G/DL (ref 31.4–35)
MCV RBC AUTO: 91.1 FL (ref 82–102)
MONOCYTES # BLD: 0.5 K/UL (ref 0.1–1.3)
MONOCYTES NFR BLD: 9 % (ref 4–12)
NEUTS SEG # BLD: 2.9 K/UL (ref 1.7–8.2)
NEUTS SEG NFR BLD: 53 % (ref 43–78)
NRBC # BLD: 0 K/UL (ref 0–0.2)
PLATELET # BLD AUTO: 289 K/UL (ref 150–450)
PMV BLD AUTO: 9.4 FL (ref 9.4–12.3)
POTASSIUM SERPL-SCNC: 4.6 MMOL/L (ref 3.5–5.1)
PROT SERPL-MCNC: 7.4 G/DL (ref 6.3–8.2)
RBC # BLD AUTO: 4.95 M/UL (ref 4.23–5.6)
SODIUM SERPL-SCNC: 135 MMOL/L (ref 133–143)
TRIGL SERPL-MCNC: 88 MG/DL (ref 35–150)
VLDLC SERPL CALC-MCNC: 17.6 MG/DL (ref 6–23)
WBC # BLD AUTO: 5.5 K/UL (ref 4.3–11.1)

## 2022-12-28 PROCEDURE — 99396 PREV VISIT EST AGE 40-64: CPT | Performed by: FAMILY MEDICINE

## 2022-12-28 SDOH — ECONOMIC STABILITY: FOOD INSECURITY: WITHIN THE PAST 12 MONTHS, THE FOOD YOU BOUGHT JUST DIDN'T LAST AND YOU DIDN'T HAVE MONEY TO GET MORE.: NEVER TRUE

## 2022-12-28 SDOH — ECONOMIC STABILITY: FOOD INSECURITY: WITHIN THE PAST 12 MONTHS, YOU WORRIED THAT YOUR FOOD WOULD RUN OUT BEFORE YOU GOT MONEY TO BUY MORE.: NEVER TRUE

## 2022-12-28 ASSESSMENT — PATIENT HEALTH QUESTIONNAIRE - PHQ9
SUM OF ALL RESPONSES TO PHQ QUESTIONS 1-9: 0
SUM OF ALL RESPONSES TO PHQ9 QUESTIONS 1 & 2: 0
SUM OF ALL RESPONSES TO PHQ QUESTIONS 1-9: 0
SUM OF ALL RESPONSES TO PHQ QUESTIONS 1-9: 0
2. FEELING DOWN, DEPRESSED OR HOPELESS: 0
SUM OF ALL RESPONSES TO PHQ QUESTIONS 1-9: 0
1. LITTLE INTEREST OR PLEASURE IN DOING THINGS: 0

## 2022-12-28 ASSESSMENT — SOCIAL DETERMINANTS OF HEALTH (SDOH): HOW HARD IS IT FOR YOU TO PAY FOR THE VERY BASICS LIKE FOOD, HOUSING, MEDICAL CARE, AND HEATING?: NOT HARD AT ALL

## 2022-12-28 NOTE — PROGRESS NOTES
[address] [facility] [address], [address]  Phone: [phone] Fax [phone]  Nirmala Keith MD             Mr. Gladis Carson is a 38 y.o.  y.o.  year old  male patient who comes in for complete physical. He has been doing well. No fatigue, and has been working onweight loss. No shortness of breath, chest pain, palpitations. No abdominal pain, diarrhea, or constipation. No abnormal skin changes. Has  been working on diet and exercise. Feels well. ROS:  Feeling well. No dyspnea or chest pain on exertion. No abdominal pain, change in bowel habits, black or bloody stools. Did see cardiology last year about his fast heart rate and had a calcium score and stress testing and everything was normal.  The cardiologist cleared him. His heart rate has come back to normal.  The sense was that it was due to COVID and he has recovered from that. He did have some elevations in his liver tests. He said he did have a CAT scan for an unrelated reason and it did show fatty liver. Mr. Gladis Carson  has a past medical history of Diverticulitis. Mr. Gladis Carson  has a past surgical history that includes gi (10/2015). Mr. Gladis Carson   Current Outpatient Medications   Medication Sig Dispense Refill    ibuprofen (ADVIL;MOTRIN) 200 MG tablet Take by mouth       No current facility-administered medications for this visit. Mr. Gladis Carson  reports that he has never smoked. He has never used smokeless tobacco. He reports current alcohol use. He reports that he does not use drugs. Mr. Gladis Carson family history includes Cancer in his mother; Heart Disease in his father; Hypertension in his father. No Known Allergies      /89   Pulse 84   Temp 98.2 °F (36.8 °C) (Temporal)   Resp 16   Ht 5' 10\" (1.778 m)   Wt 269 lb 9.6 oz (122.3 kg)   SpO2 98% Comment: room air sitting  BMI 38.68 kg/m²       General: alert, oriented x 3, pleasant.   HEENT: Normocephalic, atraumatic, pupils equal and reactive to light. Tympanic membranes intact without erythema or edema. Oropharynx clear. Neck: Supple, no masses or thyromegaly or lymphadenopathy. Lungs: clear to auscultation bilaterally without wheezing or rhonchi. CV: regular rate and rhythm, without murmurs, rubs, or gallops. Abdomen: soft, nontender, nondistended, no masses. No hepatosplenomegaly. No abdominal bruits. No CVAT tenderness. Ext: No lower extremity edema. No lesions about the feet. : deferred. Rectal: deferred    No results found for this visit on 12/28/22. No results found for any visits on 12/28/22. Olegario Ya was seen today for annual exam.    Diagnoses and all orders for this visit:    Routine general medical examination at a health care facility    Fatty liver  -     Comprehensive Metabolic Panel; Future  -     Comprehensive Metabolic Panel    Class 2 obesity due to excess calories without serious comorbidity with body mass index (BMI) of 38.0 to 38.9 in adult    Screening, anemia, deficiency, iron  -     CBC with Auto Differential; Future  -     CBC with Auto Differential    Screening for diabetes mellitus  -     Comprehensive Metabolic Panel; Future  -     Comprehensive Metabolic Panel    Screening for cardiovascular condition  -     Lipid Panel; Future  -     Lipid Panel    Screening for HIV (human immunodeficiency virus)  -     HIV 1/2 Ag/Ab, 4TH Generation,W Rflx Confirm; Future  -     HIV 1/2 Ag/Ab, 4TH Generation,W Rflx Confirm    Elevated liver function tests  -     Hepatic Function Panel    We did talk about reducing alcohol and really working on losing weight to help his fatty liver. He will work on this. 30 minutes of moderate aerobic exercise 5 days a week and green leafy vegetables daily for cardiovascular risk reduction.     Vanessa Castañeda MD

## 2022-12-30 NOTE — RESULT ENCOUNTER NOTE
Let patient know labs normal other than a slight elevation in his liver tests. Continue to really work on losing weight and reducing all alcohol.

## 2023-09-15 ENCOUNTER — PATIENT MESSAGE (OUTPATIENT)
Dept: FAMILY MEDICINE CLINIC | Facility: CLINIC | Age: 43
End: 2023-09-15

## 2023-09-15 DIAGNOSIS — Z12.11 SCREENING FOR MALIGNANT NEOPLASM OF COLON: Primary | ICD-10-CM

## 2023-09-18 NOTE — TELEPHONE ENCOUNTER
From: Cheko Kathleen  To: Dr. Randle Flax: 9/15/2023 2:46 PM EDT  Subject: Colonoscopy    Good afternoon Dr. Reza Chu lists that I am overdue for a colonoscopy. I turned 37 in May and recall that you had mentioned that was the age that I should begin getting them. Could you please order one so that I can schedule the appointment? Please let me know if you need any information from me.   Thanks

## 2024-01-07 ASSESSMENT — PATIENT HEALTH QUESTIONNAIRE - PHQ9
SUM OF ALL RESPONSES TO PHQ9 QUESTIONS 1 & 2: 0
2. FEELING DOWN, DEPRESSED OR HOPELESS: 0
1. LITTLE INTEREST OR PLEASURE IN DOING THINGS: 0
SUM OF ALL RESPONSES TO PHQ QUESTIONS 1-9: 0
2. FEELING DOWN, DEPRESSED OR HOPELESS: NOT AT ALL
1. LITTLE INTEREST OR PLEASURE IN DOING THINGS: NOT AT ALL
SUM OF ALL RESPONSES TO PHQ9 QUESTIONS 1 & 2: 0
SUM OF ALL RESPONSES TO PHQ QUESTIONS 1-9: 0

## 2024-01-10 ENCOUNTER — OFFICE VISIT (OUTPATIENT)
Dept: FAMILY MEDICINE CLINIC | Facility: CLINIC | Age: 44
End: 2024-01-10
Payer: COMMERCIAL

## 2024-01-10 VITALS
HEART RATE: 93 BPM | OXYGEN SATURATION: 98 % | WEIGHT: 267 LBS | SYSTOLIC BLOOD PRESSURE: 134 MMHG | RESPIRATION RATE: 16 BRPM | BODY MASS INDEX: 38.22 KG/M2 | DIASTOLIC BLOOD PRESSURE: 88 MMHG | TEMPERATURE: 97.3 F | HEIGHT: 70 IN

## 2024-01-10 DIAGNOSIS — R00.2 PALPITATION: ICD-10-CM

## 2024-01-10 DIAGNOSIS — Z13.1 SCREENING FOR DIABETES MELLITUS: ICD-10-CM

## 2024-01-10 DIAGNOSIS — K76.0 FATTY LIVER: ICD-10-CM

## 2024-01-10 DIAGNOSIS — Z90.49 H/O HEMICOLECTOMY: ICD-10-CM

## 2024-01-10 DIAGNOSIS — E66.09 CLASS 2 OBESITY DUE TO EXCESS CALORIES WITHOUT SERIOUS COMORBIDITY WITH BODY MASS INDEX (BMI) OF 38.0 TO 38.9 IN ADULT: ICD-10-CM

## 2024-01-10 DIAGNOSIS — Z13.0 SCREENING, ANEMIA, DEFICIENCY, IRON: ICD-10-CM

## 2024-01-10 DIAGNOSIS — Z00.00 ROUTINE GENERAL MEDICAL EXAMINATION AT A HEALTH CARE FACILITY: Primary | ICD-10-CM

## 2024-01-10 DIAGNOSIS — Z13.6 SCREENING FOR CARDIOVASCULAR CONDITION: ICD-10-CM

## 2024-01-10 LAB
ALBUMIN SERPL-MCNC: 4.5 G/DL (ref 3.5–5)
ALBUMIN/GLOB SERPL: 1.4 (ref 0.4–1.6)
ALP SERPL-CCNC: 43 U/L (ref 50–136)
ALT SERPL-CCNC: 108 U/L (ref 12–65)
ANION GAP SERPL CALC-SCNC: 3 MMOL/L (ref 2–11)
AST SERPL-CCNC: 45 U/L (ref 15–37)
BASOPHILS # BLD: 0.1 K/UL (ref 0–0.2)
BASOPHILS NFR BLD: 1 % (ref 0–2)
BILIRUB SERPL-MCNC: 1.2 MG/DL (ref 0.2–1.1)
BUN SERPL-MCNC: 14 MG/DL (ref 6–23)
CALCIUM SERPL-MCNC: 9.9 MG/DL (ref 8.3–10.4)
CHLORIDE SERPL-SCNC: 106 MMOL/L (ref 103–113)
CHOLEST SERPL-MCNC: 189 MG/DL
CO2 SERPL-SCNC: 28 MMOL/L (ref 21–32)
CREAT SERPL-MCNC: 1.1 MG/DL (ref 0.8–1.5)
DIFFERENTIAL METHOD BLD: NORMAL
EOSINOPHIL # BLD: 0.2 K/UL (ref 0–0.8)
EOSINOPHIL NFR BLD: 3 % (ref 0.5–7.8)
ERYTHROCYTE [DISTWIDTH] IN BLOOD BY AUTOMATED COUNT: 12.1 % (ref 11.9–14.6)
GLOBULIN SER CALC-MCNC: 3.2 G/DL (ref 2.8–4.5)
GLUCOSE SERPL-MCNC: 96 MG/DL (ref 65–100)
HCT VFR BLD AUTO: 48.6 % (ref 41.1–50.3)
HDLC SERPL-MCNC: 49 MG/DL (ref 40–60)
HDLC SERPL: 3.9
HGB BLD-MCNC: 16.6 G/DL (ref 13.6–17.2)
IMM GRANULOCYTES # BLD AUTO: 0 K/UL (ref 0–0.5)
IMM GRANULOCYTES NFR BLD AUTO: 0 % (ref 0–5)
LDLC SERPL CALC-MCNC: 126.8 MG/DL
LYMPHOCYTES # BLD: 2.2 K/UL (ref 0.5–4.6)
LYMPHOCYTES NFR BLD: 35 % (ref 13–44)
MCH RBC QN AUTO: 31 PG (ref 26.1–32.9)
MCHC RBC AUTO-ENTMCNC: 34.2 G/DL (ref 31.4–35)
MCV RBC AUTO: 90.7 FL (ref 82–102)
MONOCYTES # BLD: 0.6 K/UL (ref 0.1–1.3)
MONOCYTES NFR BLD: 10 % (ref 4–12)
NEUTS SEG # BLD: 3.2 K/UL (ref 1.7–8.2)
NEUTS SEG NFR BLD: 51 % (ref 43–78)
NRBC # BLD: 0 K/UL (ref 0–0.2)
PLATELET # BLD AUTO: 286 K/UL (ref 150–450)
PMV BLD AUTO: 9.5 FL (ref 9.4–12.3)
POTASSIUM SERPL-SCNC: 4.2 MMOL/L (ref 3.5–5.1)
PROT SERPL-MCNC: 7.7 G/DL (ref 6.3–8.2)
RBC # BLD AUTO: 5.36 M/UL (ref 4.23–5.6)
SODIUM SERPL-SCNC: 137 MMOL/L (ref 136–146)
TRIGL SERPL-MCNC: 66 MG/DL (ref 35–150)
TSH, 3RD GENERATION: 0.94 UIU/ML (ref 0.36–3.74)
VLDLC SERPL CALC-MCNC: 13.2 MG/DL (ref 6–23)
WBC # BLD AUTO: 6.2 K/UL (ref 4.3–11.1)

## 2024-01-10 PROCEDURE — 99214 OFFICE O/P EST MOD 30 MIN: CPT | Performed by: FAMILY MEDICINE

## 2024-01-10 PROCEDURE — 99396 PREV VISIT EST AGE 40-64: CPT | Performed by: FAMILY MEDICINE

## 2024-01-10 PROCEDURE — 93000 ELECTROCARDIOGRAM COMPLETE: CPT | Performed by: FAMILY MEDICINE

## 2024-01-10 SDOH — ECONOMIC STABILITY: INCOME INSECURITY: HOW HARD IS IT FOR YOU TO PAY FOR THE VERY BASICS LIKE FOOD, HOUSING, MEDICAL CARE, AND HEATING?: PATIENT DECLINED

## 2024-01-10 SDOH — ECONOMIC STABILITY: HOUSING INSECURITY
IN THE LAST 12 MONTHS, WAS THERE A TIME WHEN YOU DID NOT HAVE A STEADY PLACE TO SLEEP OR SLEPT IN A SHELTER (INCLUDING NOW)?: PATIENT DECLINED

## 2024-01-10 SDOH — ECONOMIC STABILITY: FOOD INSECURITY: WITHIN THE PAST 12 MONTHS, YOU WORRIED THAT YOUR FOOD WOULD RUN OUT BEFORE YOU GOT MONEY TO BUY MORE.: PATIENT DECLINED

## 2024-01-10 SDOH — ECONOMIC STABILITY: FOOD INSECURITY: WITHIN THE PAST 12 MONTHS, THE FOOD YOU BOUGHT JUST DIDN'T LAST AND YOU DIDN'T HAVE MONEY TO GET MORE.: PATIENT DECLINED

## 2024-01-10 NOTE — PROGRESS NOTES
[facility] [place] [address]  Phone: [phone] Fax [phone]  Adrianne Gibson MD             Mr. Aburto is a 43 y.o.  y.o.  year old  male patient who comes in for complete physical. He has been doing well. No fatigue, weight loss, weight gain. No shortness of breath, chest pain. No abdominal pain, diarrhea, or constipation. No abnormal skin changes. Has just recently been working on diet and on exercise. Feels well.      ROS:  Feeling well. No dyspnea or chest pain on exertion.  No abdominal pain, change in bowel habits, black or bloody stools.      Last Colonoscopy: 11/23, to repeat in 3 years due to sessile serrated polyp.    Just in the past week he has noted a few scattered palpitations.  They happen just at night and he will feel it happen 1 time and then go back to normal.  He actually was able to get a EKG tracing on his Apple Watch and it did show a PVC.  He is not short of breath and does not have chest pain.  He has seen cardiology in the past and they did a calcium score which was 0. Stress testing was normal a year ago.  He does not have exercise intolerance.    He does have a history of fatty liver and tries to stay away from Advil and Tylenol.  He is watching his alcohol intake this month.    He did have his colonoscopy in November and they did see a polyp so he has to repeat that again in 3 years.  He did have a hemicolectomy some years ago due to recurrent diverticulitis.  He has done well since that time.      Mr. Aburto  has a past medical history of Diverticulitis.    Mr. Aburto  has a past surgical history that includes gi (10/2015).    Mr. Aburto   No current outpatient medications on file.     No current facility-administered medications for this visit.       Mr. Aburto  reports that he has never smoked. He has never used smokeless tobacco. He reports current alcohol use. He reports that he does not use drugs.    Mr. Aburto family

## 2024-01-11 NOTE — RESULT ENCOUNTER NOTE
Let patient know labs normal/good with his chol being 189 total so less than 200 which is good, but his liver tests are up a bit. Really work on weight loss as this will help.Stop the creatine as well. Thyroid and anemia counts are normal. Would like to see him in 6 months to recheck his liver tests - can schedule a lab visit only for then.

## 2025-01-08 ENCOUNTER — LAB (OUTPATIENT)
Dept: FAMILY MEDICINE CLINIC | Facility: CLINIC | Age: 45
End: 2025-01-08

## 2025-01-08 DIAGNOSIS — Z13.0 SCREENING, ANEMIA, DEFICIENCY, IRON: ICD-10-CM

## 2025-01-08 DIAGNOSIS — K76.0 FATTY LIVER: ICD-10-CM

## 2025-01-08 DIAGNOSIS — Z13.6 SCREENING FOR CARDIOVASCULAR CONDITION: Primary | ICD-10-CM

## 2025-01-08 DIAGNOSIS — Z00.00 ROUTINE GENERAL MEDICAL EXAMINATION AT A HEALTH CARE FACILITY: ICD-10-CM

## 2025-01-08 DIAGNOSIS — Z13.1 SCREENING FOR DIABETES MELLITUS: ICD-10-CM

## 2025-01-08 LAB
ALBUMIN SERPL-MCNC: 4.3 G/DL (ref 3.5–5)
ALBUMIN/GLOB SERPL: 1.4 (ref 1–1.9)
ALP SERPL-CCNC: 52 U/L (ref 40–129)
ALT SERPL-CCNC: 46 U/L (ref 8–55)
ANION GAP SERPL CALC-SCNC: 10 MMOL/L (ref 7–16)
AST SERPL-CCNC: 31 U/L (ref 15–37)
BASOPHILS # BLD: 0.08 K/UL (ref 0–0.2)
BASOPHILS NFR BLD: 1.3 % (ref 0–2)
BILIRUB DIRECT SERPL-MCNC: <0.2 MG/DL (ref 0–0.4)
BILIRUB SERPL-MCNC: 0.8 MG/DL (ref 0–1.2)
BUN SERPL-MCNC: 13 MG/DL (ref 6–23)
CALCIUM SERPL-MCNC: 9.8 MG/DL (ref 8.8–10.2)
CHLORIDE SERPL-SCNC: 99 MMOL/L (ref 98–107)
CHOLEST SERPL-MCNC: 192 MG/DL (ref 0–200)
CO2 SERPL-SCNC: 26 MMOL/L (ref 20–29)
CREAT SERPL-MCNC: 0.92 MG/DL (ref 0.8–1.3)
DIFFERENTIAL METHOD BLD: ABNORMAL
EOSINOPHIL # BLD: 0.24 K/UL (ref 0–0.8)
EOSINOPHIL NFR BLD: 3.8 % (ref 0.5–7.8)
ERYTHROCYTE [DISTWIDTH] IN BLOOD BY AUTOMATED COUNT: 12.1 % (ref 11.9–14.6)
GLOBULIN SER CALC-MCNC: 3.2 G/DL (ref 2.3–3.5)
GLUCOSE SERPL-MCNC: 101 MG/DL (ref 70–99)
HCT VFR BLD AUTO: 46.5 % (ref 41.1–50.3)
HDLC SERPL-MCNC: 41 MG/DL (ref 40–60)
HDLC SERPL: 4.7 (ref 0–5)
HGB BLD-MCNC: 16.3 G/DL (ref 13.6–17.2)
IMM GRANULOCYTES # BLD AUTO: 0.02 K/UL (ref 0–0.5)
IMM GRANULOCYTES NFR BLD AUTO: 0.3 % (ref 0–5)
LDLC SERPL CALC-MCNC: 133 MG/DL (ref 0–100)
LYMPHOCYTES # BLD: 2.23 K/UL (ref 0.5–4.6)
LYMPHOCYTES NFR BLD: 34.8 % (ref 13–44)
MCH RBC QN AUTO: 31.3 PG (ref 26.1–32.9)
MCHC RBC AUTO-ENTMCNC: 35.1 G/DL (ref 31.4–35)
MCV RBC AUTO: 89.3 FL (ref 82–102)
MONOCYTES # BLD: 0.53 K/UL (ref 0.1–1.3)
MONOCYTES NFR BLD: 8.3 % (ref 4–12)
NEUTS SEG # BLD: 3.3 K/UL (ref 1.7–8.2)
NEUTS SEG NFR BLD: 51.5 % (ref 43–78)
NRBC # BLD: 0 K/UL (ref 0–0.2)
PLATELET # BLD AUTO: 289 K/UL (ref 150–450)
PMV BLD AUTO: 8.9 FL (ref 9.4–12.3)
POTASSIUM SERPL-SCNC: 4.1 MMOL/L (ref 3.5–5.1)
PROT SERPL-MCNC: 7.5 G/DL (ref 6.3–8.2)
RBC # BLD AUTO: 5.21 M/UL (ref 4.23–5.6)
SODIUM SERPL-SCNC: 134 MMOL/L (ref 136–145)
TRIGL SERPL-MCNC: 93 MG/DL (ref 0–150)
VLDLC SERPL CALC-MCNC: 19 MG/DL (ref 6–23)
WBC # BLD AUTO: 6.4 K/UL (ref 4.3–11.1)

## 2025-01-12 ENCOUNTER — TELEPHONE (OUTPATIENT)
Dept: FAMILY MEDICINE CLINIC | Facility: CLINIC | Age: 45
End: 2025-01-12

## 2025-01-17 ASSESSMENT — PATIENT HEALTH QUESTIONNAIRE - PHQ9
2. FEELING DOWN, DEPRESSED OR HOPELESS: NOT AT ALL
SUM OF ALL RESPONSES TO PHQ QUESTIONS 1-9: 0
SUM OF ALL RESPONSES TO PHQ9 QUESTIONS 1 & 2: 0
SUM OF ALL RESPONSES TO PHQ9 QUESTIONS 1 & 2: 0
2. FEELING DOWN, DEPRESSED OR HOPELESS: NOT AT ALL
1. LITTLE INTEREST OR PLEASURE IN DOING THINGS: NOT AT ALL
SUM OF ALL RESPONSES TO PHQ QUESTIONS 1-9: 0
1. LITTLE INTEREST OR PLEASURE IN DOING THINGS: NOT AT ALL

## 2025-01-20 ENCOUNTER — PATIENT MESSAGE (OUTPATIENT)
Dept: FAMILY MEDICINE CLINIC | Facility: CLINIC | Age: 45
End: 2025-01-20

## 2025-01-20 ENCOUNTER — OFFICE VISIT (OUTPATIENT)
Dept: FAMILY MEDICINE CLINIC | Facility: CLINIC | Age: 45
End: 2025-01-20

## 2025-01-20 VITALS
RESPIRATION RATE: 16 BRPM | BODY MASS INDEX: 39.08 KG/M2 | HEIGHT: 70 IN | WEIGHT: 273 LBS | OXYGEN SATURATION: 98 % | HEART RATE: 102 BPM | DIASTOLIC BLOOD PRESSURE: 94 MMHG | TEMPERATURE: 97.7 F | SYSTOLIC BLOOD PRESSURE: 132 MMHG

## 2025-01-20 DIAGNOSIS — R06.83 SNORING: ICD-10-CM

## 2025-01-20 DIAGNOSIS — R53.82 CHRONIC FATIGUE: ICD-10-CM

## 2025-01-20 DIAGNOSIS — Z00.00 ROUTINE GENERAL MEDICAL EXAMINATION AT A HEALTH CARE FACILITY: Primary | ICD-10-CM

## 2025-01-20 DIAGNOSIS — Z13.6 SCREENING FOR CARDIOVASCULAR CONDITION: ICD-10-CM

## 2025-01-20 DIAGNOSIS — Z12.5 SCREENING FOR MALIGNANT NEOPLASM OF PROSTATE: ICD-10-CM

## 2025-01-20 DIAGNOSIS — J01.00 ACUTE NON-RECURRENT MAXILLARY SINUSITIS: ICD-10-CM

## 2025-01-20 DIAGNOSIS — Z13.1 SCREENING FOR DIABETES MELLITUS: ICD-10-CM

## 2025-01-20 DIAGNOSIS — N52.9 VASCULOGENIC ERECTILE DYSFUNCTION, UNSPECIFIED VASCULOGENIC ERECTILE DYSFUNCTION TYPE: ICD-10-CM

## 2025-01-20 DIAGNOSIS — Z13.0 SCREENING, ANEMIA, DEFICIENCY, IRON: ICD-10-CM

## 2025-01-20 LAB — PSA SERPL-MCNC: 0.5 NG/ML (ref 0–4)

## 2025-01-20 RX ORDER — AMOXICILLIN 500 MG/1
500 CAPSULE ORAL 2 TIMES DAILY
Qty: 20 CAPSULE | Refills: 0 | Status: SHIPPED | OUTPATIENT
Start: 2025-01-20 | End: 2025-01-30

## 2025-01-20 RX ORDER — SILDENAFIL 100 MG/1
100 TABLET, FILM COATED ORAL PRN
Qty: 30 TABLET | Refills: 3 | Status: SHIPPED | OUTPATIENT
Start: 2025-01-20

## 2025-01-20 SDOH — ECONOMIC STABILITY: FOOD INSECURITY: WITHIN THE PAST 12 MONTHS, YOU WORRIED THAT YOUR FOOD WOULD RUN OUT BEFORE YOU GOT MONEY TO BUY MORE.: NEVER TRUE

## 2025-01-20 SDOH — ECONOMIC STABILITY: FOOD INSECURITY: WITHIN THE PAST 12 MONTHS, THE FOOD YOU BOUGHT JUST DIDN'T LAST AND YOU DIDN'T HAVE MONEY TO GET MORE.: NEVER TRUE

## 2025-01-20 NOTE — PROGRESS NOTES
[facility] [place] [address]  Phone: [phone] Fax [phone]  Adrianne Gibson MD             Mr. Aburto is a 44 y.o.  y.o.  year old  male patient who comes in for complete physical. He has been doing well. No fatigue, weight loss, weight gain. No shortness of breath, chest pain, palpitations. No abdominal pain, diarrhea, or constipation. No abnormal skin changes. Has  been working on diet and exercise. Feels well.      ROS:  Feeling well. No dyspnea or chest pain on exertion.  No abdominal pain, change in bowel habits, black or bloody stools.      Last Colonoscopy: 2023      Would like PSA for screening.  He is willing to pay for it if his insurance will not.    He has taken Viagra before and would like a refill of it.  He does does 50 mg and it works well.  He has not had any side effects.  He does not have chest pain nor does he take nitroglycerin or have coronary disease.  He would like his testosterone checked as well.    He was wondering about doing a medicine like Zepbound for weight loss.    He does have a cough that has had for about 3 weeks and he has had some drainage.    His watch has been telling him that he has sleep apnea.  He does snore.  He has had some chronic fatigue.    Mr. Aburto  has a past medical history of Diverticulitis.    Mr. Aburto  has a past surgical history that includes gi (10/2015).    Mr. Aburto   Current Outpatient Medications   Medication Sig Dispense Refill    sildenafil (VIAGRA) 100 MG tablet Take 1 tablet by mouth as needed for Erectile Dysfunction 30 tablet 3    amoxicillin (AMOXIL) 500 MG capsule Take 1 capsule by mouth 2 times daily for 10 days 20 capsule 0     No current facility-administered medications for this visit.       Mr. Aburto  reports that he has never smoked. He has never used smokeless tobacco. He reports current alcohol use. He reports that he does not use drugs.    Mr. Aburto family history includes

## 2025-01-22 LAB
TESTOST FREE SERPL-MCNC: 7.8 PG/ML (ref 6.8–21.5)
TESTOST SERPL-MCNC: 266 NG/DL (ref 264–916)

## 2025-01-28 ENCOUNTER — TELEPHONE (OUTPATIENT)
Dept: FAMILY MEDICINE CLINIC | Facility: CLINIC | Age: 45
End: 2025-01-28

## 2025-01-28 NOTE — TELEPHONE ENCOUNTER
Spoke with pt was informed that his insurance would not approve the sleep study at the hospital but would approve a home sleep study. He state that he has already talk to the sleep lab and is getting the home kit sent out to his house.

## 2025-01-28 NOTE — TELEPHONE ENCOUNTER
They could not do a PA for his Sleep Study based on the notes they have it is not Medically Necessary # to call is [phone]

## 2025-02-11 ENCOUNTER — HOSPITAL ENCOUNTER (OUTPATIENT)
Dept: SLEEP CENTER | Age: 45
Discharge: HOME | End: 2025-02-13
Payer: COMMERCIAL

## 2025-02-11 PROCEDURE — 95806 SLEEP STUDY UNATT&RESP EFFT: CPT

## 2025-02-19 ENCOUNTER — TELEPHONE (OUTPATIENT)
Dept: FAMILY MEDICINE CLINIC | Facility: CLINIC | Age: 45
End: 2025-02-19

## 2025-02-19 DIAGNOSIS — G47.31 CENTRAL SLEEP APNEA: Primary | ICD-10-CM

## 2025-02-19 DIAGNOSIS — G47.33 OSA (OBSTRUCTIVE SLEEP APNEA): Primary | ICD-10-CM

## 2025-02-19 NOTE — TELEPHONE ENCOUNTER
Please let the patient know that his home sleep study did show severe sleep apnea.  He needs to go for an in sleep lab test.  We will set this up for him.

## 2025-02-19 NOTE — PROGRESS NOTES
Patient had recent sleep study that showed sleep apnea and hypoxemia. AHI- 28.4     Lowest SaO2- 68%. Per Interp patient will need a cpap titration. Sleep lab will call the patient to schedule titration or the patient can call the sleep lab at [phone] to schedule.

## 2025-02-24 ENCOUNTER — TELEPHONE (OUTPATIENT)
Dept: FAMILY MEDICINE CLINIC | Facility: CLINIC | Age: 45
End: 2025-02-24

## 2025-03-24 NOTE — PROGRESS NOTES
[facility]  [address]  [place] [address]  [phone]    Patient Name:  Cash Aburto  YOB: 1980      Office Visit 3/26/2025    CHIEF COMPLAINT:    Chief Complaint   Patient presents with    Sleep Apnea    Results       HISTORY OF PRESENT ILLNESS:      The patient presents in outpatient consultation at the request of Dr. Adrianne Gibson for evaluation and management of obstructive sleep apnea. PMH includes fatty liver and obesity.     The diagnostic HST was notable for an apnea hypopnea index of 28.4 including 133 obstructive apneas and 63 hypopneas.  Oxygen desaturations are low as 68% were noted with SpO2 less than 89% for a total of 61.5 minutes of the test. CPAP titration was denied by insurance.    Patient has been told that he snores, denies any witnessed apneas or awakening gasping.  Denies any vivid dreams or parasomnias.  Denies any RLS symptoms including leg cramps or aching/crawling.  Does notice some at night.  He is falling asleep easily and may awaken 1-2 times nightly and is usually able to go back to sleep easily.  Typically going to sleep 11 PM-7 AM, sleeps in the prone position.  He is rested in the morning, denies any daytime fatigue or napping.  Current [place] score is 5/24.  Sometimes does have morning headaches but denies any dry mouth.    He is retired.  Denies any over-the-counter sleep aids.  Denies any tobacco.  Does drink alcohol socially, states not daily.  Does drink 1 cup of coffee and usually 1 energy drink per day.  He states he mentioned sleep apnea to his PCP after his Apple Watch said he had possible sleep apnea.    Sleep study results discussed with patient as well as pathophysiology of sleep apnea.  He is agreeable to CPAP. The pathophysiology of obstructive sleep apnea was reviewed with the patient.  It's potential to promote severe neurologic, cardiac, pulmonary, and gastrointestinal problems was discussed.

## 2025-03-26 ENCOUNTER — OFFICE VISIT (OUTPATIENT)
Dept: SLEEP MEDICINE | Age: 45
End: 2025-03-26
Payer: COMMERCIAL

## 2025-03-26 VITALS
HEIGHT: 70 IN | SYSTOLIC BLOOD PRESSURE: 134 MMHG | HEART RATE: 94 BPM | DIASTOLIC BLOOD PRESSURE: 84 MMHG | RESPIRATION RATE: 18 BRPM | BODY MASS INDEX: 38.8 KG/M2 | TEMPERATURE: 97.3 F | OXYGEN SATURATION: 98 % | WEIGHT: 271 LBS

## 2025-03-26 DIAGNOSIS — G47.34 NOCTURNAL HYPOXEMIA: ICD-10-CM

## 2025-03-26 DIAGNOSIS — G47.33 OSA (OBSTRUCTIVE SLEEP APNEA): Primary | ICD-10-CM

## 2025-03-26 DIAGNOSIS — R06.83 SNORING: ICD-10-CM

## 2025-03-26 DIAGNOSIS — E66.9 OBESITY (BMI 30-39.9): ICD-10-CM

## 2025-03-26 PROCEDURE — 99204 OFFICE O/P NEW MOD 45 MIN: CPT | Performed by: STUDENT IN AN ORGANIZED HEALTH CARE EDUCATION/TRAINING PROGRAM

## 2025-03-26 ASSESSMENT — SLEEP AND FATIGUE QUESTIONNAIRES
HOW LIKELY ARE YOU TO NOD OFF OR FALL ASLEEP WHEN YOU ARE A PASSENGER IN A CAR FOR AN HOUR WITHOUT A BREAK: WOULD NEVER DOZE
ESS TOTAL SCORE: 5
HOW LIKELY ARE YOU TO NOD OFF OR FALL ASLEEP WHILE SITTING QUIETLY AFTER LUNCH WITHOUT ALCOHOL: SLIGHT CHANCE OF DOZING
HOW LIKELY ARE YOU TO NOD OFF OR FALL ASLEEP WHILE LYING DOWN TO REST IN THE AFTERNOON WHEN CIRCUMSTANCES PERMIT: MODERATE CHANCE OF DOZING
HOW LIKELY ARE YOU TO NOD OFF OR FALL ASLEEP WHILE WATCHING TV: SLIGHT CHANCE OF DOZING
HOW LIKELY ARE YOU TO NOD OFF OR FALL ASLEEP WHILE SITTING AND TALKING TO SOMEONE: WOULD NEVER DOZE
HOW LIKELY ARE YOU TO NOD OFF OR FALL ASLEEP WHILE SITTING AND READING: SLIGHT CHANCE OF DOZING
HOW LIKELY ARE YOU TO NOD OFF OR FALL ASLEEP WHILE SITTING INACTIVE IN A PUBLIC PLACE: WOULD NEVER DOZE
HOW LIKELY ARE YOU TO NOD OFF OR FALL ASLEEP IN A CAR, WHILE STOPPED FOR A FEW MINUTES IN TRAFFIC: WOULD NEVER DOZE

## 2025-03-26 NOTE — PATIENT INSTRUCTIONS
fluids, insulin, endocrine consult
serious problems, including injury, accidents, anxiety, and depression.    What might cause poor sleeping?    Many things can cause sleep problems, including:    Stress.  Stress can be caused by fear about a single event, such as giving a speech.  Or you may have ongoing stress, such as worry about work or school.    Depression, anxiety, and other mental or emotional conditions.    Changes in your sleep habits or surroundings.  This includes changes that happen where you sleep, such as noise, light, or sleeping in a different bed.  It also includes changes in your sleep pattern, such as having jet lab or working a late shift.    Health problems, such as pain, breathing problems, and restless legs syndrome.    Lack of regular exercise.    How can you help yourself?    Here are some tips that may help you sleep more soundly and wake up feeling more refreshed.    Your sleeping area    Use your bedroom only for sleeping and sex.  A bit of light reading may help you fall asleep.  But if it doesn't, do your reading elsewhere in the house.  Don't watch TV in bed.    Be sure your bed is big enough to stretch out comfortable, especially if you have a sleep partner.    Keep your bedroom quiet, dark, and cool.  Use curtains, blinds, or sleep mask to block out light.  To block out noise, use earplugs, soothing music, or a \"white noise\" machine.     Your evening and bedtime routine    Create a relaxing bedtime routine.  You might want to take a warm shower or bath, listen to soothing music, or drink a cup of non-caffeinated tea.      Go to bed at the same time every night.  And get up at the same time every morning, even if you feel tired.    What to avoid:    Limit caffeine (coffee, tea, caffeinated sodas) during the day, and dont have any for at least 4 to 6 hours before bedtime.    Don't drink alcohol before bedtime.  Alcohol can cause you to wake up more often during the night.    Don't smoke or use tobacco, especially in

## 2025-03-27 ENCOUNTER — TELEPHONE (OUTPATIENT)
Dept: SLEEP MEDICINE | Age: 45
End: 2025-03-27

## 2025-03-27 NOTE — TELEPHONE ENCOUNTER
Temo with [employer] insurance called says that the patient needs PA for the titration. Please contact     Aurelia     [phone]

## 2025-08-21 ENCOUNTER — TELEPHONE (OUTPATIENT)
Age: 45
End: 2025-08-21

## 2025-08-22 ENCOUNTER — OFFICE VISIT (OUTPATIENT)
Age: 45
End: 2025-08-22
Payer: COMMERCIAL

## 2025-08-22 ENCOUNTER — TELEPHONE (OUTPATIENT)
Age: 45
End: 2025-08-22

## 2025-08-22 VITALS
WEIGHT: 273 LBS | BODY MASS INDEX: 39.08 KG/M2 | HEART RATE: 80 BPM | DIASTOLIC BLOOD PRESSURE: 83 MMHG | HEIGHT: 70 IN | SYSTOLIC BLOOD PRESSURE: 117 MMHG | RESPIRATION RATE: 14 BRPM | OXYGEN SATURATION: 98 %

## 2025-08-22 DIAGNOSIS — G47.34 NOCTURNAL HYPOXEMIA: ICD-10-CM

## 2025-08-22 DIAGNOSIS — E66.9 OBESITY (BMI 30-39.9): ICD-10-CM

## 2025-08-22 DIAGNOSIS — G47.33 OSA (OBSTRUCTIVE SLEEP APNEA): Primary | ICD-10-CM

## 2025-08-22 PROCEDURE — 99214 OFFICE O/P EST MOD 30 MIN: CPT | Performed by: STUDENT IN AN ORGANIZED HEALTH CARE EDUCATION/TRAINING PROGRAM

## 2025-08-22 ASSESSMENT — SLEEP AND FATIGUE QUESTIONNAIRES
HOW LIKELY ARE YOU TO NOD OFF OR FALL ASLEEP WHILE LYING DOWN TO REST IN THE AFTERNOON WHEN CIRCUMSTANCES PERMIT: WOULD NEVER DOZE
HOW LIKELY ARE YOU TO NOD OFF OR FALL ASLEEP WHILE SITTING QUIETLY AFTER LUNCH WITHOUT ALCOHOL: WOULD NEVER DOZE
HOW LIKELY ARE YOU TO NOD OFF OR FALL ASLEEP WHILE SITTING AND TALKING TO SOMEONE: WOULD NEVER DOZE
HOW LIKELY ARE YOU TO NOD OFF OR FALL ASLEEP IN A CAR, WHILE STOPPED FOR A FEW MINUTES IN TRAFFIC: WOULD NEVER DOZE
HOW LIKELY ARE YOU TO NOD OFF OR FALL ASLEEP WHILE SITTING AND READING: WOULD NEVER DOZE
HOW LIKELY ARE YOU TO NOD OFF OR FALL ASLEEP WHEN YOU ARE A PASSENGER IN A CAR FOR AN HOUR WITHOUT A BREAK: WOULD NEVER DOZE
HOW LIKELY ARE YOU TO NOD OFF OR FALL ASLEEP WHILE WATCHING TV: WOULD NEVER DOZE
ESS TOTAL SCORE: 0
HOW LIKELY ARE YOU TO NOD OFF OR FALL ASLEEP WHILE SITTING INACTIVE IN A PUBLIC PLACE: WOULD NEVER DOZE